# Patient Record
Sex: MALE | Race: WHITE | Employment: FULL TIME | URBAN - METROPOLITAN AREA
[De-identification: names, ages, dates, MRNs, and addresses within clinical notes are randomized per-mention and may not be internally consistent; named-entity substitution may affect disease eponyms.]

---

## 2019-03-16 ENCOUNTER — HOSPITAL ENCOUNTER (EMERGENCY)
Facility: MEDICAL CENTER | Age: 59
End: 2019-03-17
Attending: EMERGENCY MEDICINE
Payer: MEDICAID

## 2019-03-16 DIAGNOSIS — M54.41 ACUTE RIGHT-SIDED LOW BACK PAIN WITH RIGHT-SIDED SCIATICA: ICD-10-CM

## 2019-03-16 PROCEDURE — 85027 COMPLETE CBC AUTOMATED: CPT

## 2019-03-16 PROCEDURE — 96375 TX/PRO/DX INJ NEW DRUG ADDON: CPT

## 2019-03-16 PROCEDURE — 700111 HCHG RX REV CODE 636 W/ 250 OVERRIDE (IP): Performed by: EMERGENCY MEDICINE

## 2019-03-16 PROCEDURE — 99285 EMERGENCY DEPT VISIT HI MDM: CPT

## 2019-03-16 PROCEDURE — 96374 THER/PROPH/DIAG INJ IV PUSH: CPT

## 2019-03-16 PROCEDURE — 80048 BASIC METABOLIC PNL TOTAL CA: CPT

## 2019-03-16 PROCEDURE — 85007 BL SMEAR W/DIFF WBC COUNT: CPT

## 2019-03-16 RX ORDER — KETOROLAC TROMETHAMINE 30 MG/ML
15 INJECTION, SOLUTION INTRAMUSCULAR; INTRAVENOUS ONCE
Status: COMPLETED | OUTPATIENT
Start: 2019-03-17 | End: 2019-03-16

## 2019-03-16 RX ORDER — ONDANSETRON 2 MG/ML
4 INJECTION INTRAMUSCULAR; INTRAVENOUS ONCE
Status: COMPLETED | OUTPATIENT
Start: 2019-03-17 | End: 2019-03-16

## 2019-03-16 RX ORDER — MORPHINE SULFATE 4 MG/ML
4 INJECTION, SOLUTION INTRAMUSCULAR; INTRAVENOUS ONCE
Status: COMPLETED | OUTPATIENT
Start: 2019-03-17 | End: 2019-03-16

## 2019-03-16 RX ADMIN — KETOROLAC TROMETHAMINE 15 MG: 30 INJECTION, SOLUTION INTRAMUSCULAR; INTRAVENOUS at 23:47

## 2019-03-16 RX ADMIN — MORPHINE SULFATE 4 MG: 4 INJECTION INTRAVENOUS at 23:47

## 2019-03-16 RX ADMIN — ONDANSETRON 4 MG: 2 INJECTION INTRAMUSCULAR; INTRAVENOUS at 23:47

## 2019-03-16 ASSESSMENT — PAIN DESCRIPTION - DESCRIPTORS: DESCRIPTORS: ACHING

## 2019-03-17 ENCOUNTER — APPOINTMENT (OUTPATIENT)
Dept: RADIOLOGY | Facility: MEDICAL CENTER | Age: 59
End: 2019-03-17
Attending: EMERGENCY MEDICINE
Payer: MEDICAID

## 2019-03-17 VITALS
RESPIRATION RATE: 14 BRPM | WEIGHT: 249.12 LBS | TEMPERATURE: 98.9 F | HEART RATE: 74 BPM | SYSTOLIC BLOOD PRESSURE: 119 MMHG | BODY MASS INDEX: 34.75 KG/M2 | OXYGEN SATURATION: 93 % | DIASTOLIC BLOOD PRESSURE: 60 MMHG

## 2019-03-17 LAB
ANION GAP SERPL CALC-SCNC: 9 MMOL/L (ref 0–11.9)
ANION GAP SERPL CALC-SCNC: 9 MMOL/L (ref 0–11.9)
BASOPHILS # BLD AUTO: 0 % (ref 0–1.8)
BASOPHILS # BLD: 0 K/UL (ref 0–0.12)
BUN SERPL-MCNC: 21 MG/DL (ref 8–22)
BUN SERPL-MCNC: 21 MG/DL (ref 8–22)
CALCIUM SERPL-MCNC: 9.1 MG/DL (ref 8.5–10.5)
CALCIUM SERPL-MCNC: 9.4 MG/DL (ref 8.5–10.5)
CHLORIDE SERPL-SCNC: 101 MMOL/L (ref 96–112)
CHLORIDE SERPL-SCNC: 101 MMOL/L (ref 96–112)
CO2 SERPL-SCNC: 20 MMOL/L (ref 20–33)
CO2 SERPL-SCNC: 21 MMOL/L (ref 20–33)
CREAT SERPL-MCNC: 0.97 MG/DL (ref 0.5–1.4)
CREAT SERPL-MCNC: 1.55 MG/DL (ref 0.5–1.4)
EKG IMPRESSION: NORMAL
EOSINOPHIL # BLD AUTO: 0.32 K/UL (ref 0–0.51)
EOSINOPHIL NFR BLD: 3 % (ref 0–6.9)
ERYTHROCYTE [DISTWIDTH] IN BLOOD BY AUTOMATED COUNT: 46.2 FL (ref 35.9–50)
GLUCOSE SERPL-MCNC: 65 MG/DL (ref 65–99)
GLUCOSE SERPL-MCNC: 75 MG/DL (ref 65–99)
HCT VFR BLD AUTO: 46.5 % (ref 42–52)
HGB BLD-MCNC: 17.3 G/DL (ref 14–18)
LYMPHOCYTES # BLD AUTO: 2.84 K/UL (ref 1–4.8)
LYMPHOCYTES NFR BLD: 27 % (ref 22–41)
MANUAL DIFF BLD: NORMAL
MCH RBC QN AUTO: 33.9 PG (ref 27–33)
MCHC RBC AUTO-ENTMCNC: 37.2 G/DL (ref 33.7–35.3)
MCV RBC AUTO: 91.2 FL (ref 81.4–97.8)
MONOCYTES # BLD AUTO: 0.42 K/UL (ref 0–0.85)
MONOCYTES NFR BLD AUTO: 4 % (ref 0–13.4)
MORPHOLOGY BLD-IMP: NORMAL
NEUTROPHILS # BLD AUTO: 6.93 K/UL (ref 1.82–7.42)
NEUTROPHILS NFR BLD: 65 % (ref 44–72)
NEUTS BAND NFR BLD MANUAL: 1 % (ref 0–10)
NRBC # BLD AUTO: 0 K/UL
NRBC BLD-RTO: 0 /100 WBC
PLATELET # BLD AUTO: 220 K/UL (ref 164–446)
PMV BLD AUTO: 11.1 FL (ref 9–12.9)
POTASSIUM SERPL-SCNC: 5.1 MMOL/L (ref 3.6–5.5)
POTASSIUM SERPL-SCNC: 6.2 MMOL/L (ref 3.6–5.5)
RBC # BLD AUTO: 5.1 M/UL (ref 4.7–6.1)
SODIUM SERPL-SCNC: 130 MMOL/L (ref 135–145)
SODIUM SERPL-SCNC: 131 MMOL/L (ref 135–145)
WBC # BLD AUTO: 10.5 K/UL (ref 4.8–10.8)

## 2019-03-17 PROCEDURE — 93005 ELECTROCARDIOGRAM TRACING: CPT | Performed by: EMERGENCY MEDICINE

## 2019-03-17 PROCEDURE — A9585 GADOBUTROL INJECTION: HCPCS | Performed by: EMERGENCY MEDICINE

## 2019-03-17 PROCEDURE — 700111 HCHG RX REV CODE 636 W/ 250 OVERRIDE (IP): Performed by: EMERGENCY MEDICINE

## 2019-03-17 PROCEDURE — 96376 TX/PRO/DX INJ SAME DRUG ADON: CPT | Mod: XU

## 2019-03-17 PROCEDURE — 72158 MRI LUMBAR SPINE W/O & W/DYE: CPT

## 2019-03-17 PROCEDURE — 700117 HCHG RX CONTRAST REV CODE 255: Performed by: EMERGENCY MEDICINE

## 2019-03-17 PROCEDURE — 80048 BASIC METABOLIC PNL TOTAL CA: CPT

## 2019-03-17 PROCEDURE — 96375 TX/PRO/DX INJ NEW DRUG ADDON: CPT | Mod: XU

## 2019-03-17 PROCEDURE — 72158 MRI LUMBAR SPINE W/O & W/DYE: CPT | Performed by: RADIOLOGY

## 2019-03-17 PROCEDURE — 700105 HCHG RX REV CODE 258: Performed by: EMERGENCY MEDICINE

## 2019-03-17 RX ORDER — GADOBUTROL 604.72 MG/ML
11 INJECTION INTRAVENOUS ONCE
Status: COMPLETED | OUTPATIENT
Start: 2019-03-17 | End: 2019-03-17

## 2019-03-17 RX ORDER — ONDANSETRON 2 MG/ML
4 INJECTION INTRAMUSCULAR; INTRAVENOUS ONCE
Status: COMPLETED | OUTPATIENT
Start: 2019-03-17 | End: 2019-03-17

## 2019-03-17 RX ORDER — HYDROMORPHONE HYDROCHLORIDE 1 MG/ML
0.5 INJECTION, SOLUTION INTRAMUSCULAR; INTRAVENOUS; SUBCUTANEOUS ONCE
Status: COMPLETED | OUTPATIENT
Start: 2019-03-17 | End: 2019-03-17

## 2019-03-17 RX ORDER — SODIUM CHLORIDE 9 MG/ML
1000 INJECTION, SOLUTION INTRAVENOUS ONCE
Status: COMPLETED | OUTPATIENT
Start: 2019-03-17 | End: 2019-03-17

## 2019-03-17 RX ADMIN — SODIUM CHLORIDE 1000 ML: 9 INJECTION, SOLUTION INTRAVENOUS at 02:30

## 2019-03-17 RX ADMIN — HYDROMORPHONE HYDROCHLORIDE 0.5 MG: 1 INJECTION, SOLUTION INTRAMUSCULAR; INTRAVENOUS; SUBCUTANEOUS at 01:00

## 2019-03-17 RX ADMIN — ONDANSETRON 4 MG: 2 INJECTION INTRAMUSCULAR; INTRAVENOUS at 04:37

## 2019-03-17 RX ADMIN — HYDROMORPHONE HYDROCHLORIDE 0.5 MG: 1 INJECTION, SOLUTION INTRAMUSCULAR; INTRAVENOUS; SUBCUTANEOUS at 02:34

## 2019-03-17 RX ADMIN — GADOBUTROL 11 ML: 604.72 INJECTION INTRAVENOUS at 03:25

## 2019-03-17 NOTE — ED NOTES
Patient appears in discomfort as stated in triage note, he reports he drove himself to ED and is now unsteady on his feet, he was wheeled from waiting room to [facility] where Dr. Andrade evaluated him.

## 2019-03-17 NOTE — ED NOTES
Patient is being discharged from ED to home. Discharge instructions were discussed by RN with patient and/or Family. No questions at this time. Medications were discussed with patient. VS within normal limits or have been addressed with patient and MD.   Discussed follow-up with PCP and Neurosurg

## 2019-03-17 NOTE — ED NOTES
Hourly rounding performed. Assessed patient complaints and Bathroom/comfort needs.  Addressed patients pain. Noted that patient had slightly low O2 sats after pain meds, placed patient on 2 LPM NC

## 2019-03-17 NOTE — ED TRIAGE NOTES
"Edil Silas  58 y.o. male  Chief Complaint   Patient presents with   • Low Back Pain     \"It is shooting down my right leg and aching. It's been a couple days now. I can't get comfortable, and can't sleep.\" Denies trauma or injury.        Pt amb to triage with limp for above complaint.  Pt is alert and oriented, speaking in full sentences, follows commands and responds appropriately to questions. Pt shifting in chair and appears uncomfortable.    Pt placed in lobby. Pt educated on triage process. Pt encouraged to alert staff for any changes.    "

## 2019-03-17 NOTE — DISCHARGE INSTRUCTIONS
Your MRI showed severe spinal stenosis, it is very important that you follow-up with a neurosurgeon for possible surgical management of this.  We have provided you follow-up information with Dr. hoang and would like you to call him first thing Monday morning; you may also follow-up with a neurosurgeon of your choice.    Your MRI results:  1. Multilevel disc and facet degeneration.  2. L2-L3 moderate to severe spinal stenosis and right foraminal narrowing.  3. L3-L4 moderate spinal stenosis.  4. Mild to moderate spinal stenosis at L1-L2 and L4-L5.  5. Foraminal narrowing as detailed.

## 2019-03-17 NOTE — ED NOTES
MRI screening form signed by patient and faxed, he reports no contraindications except for having denture which can be removed.

## 2019-03-17 NOTE — ED PROVIDER NOTES
"ED Provider Note    Scribed for Gabino Andrade M.D. by Maria Guadalupe Mcdonald. 3/16/2019, 11:13 PM.    Primary care provider: Frederic Shafer M.D.  Means of arrival: Walk-in  History obtained from: Patient  History limited by: None    CHIEF COMPLAINT  Chief Complaint   Patient presents with   • Low Back Pain     \"It is shooting down my right leg and aching. It's been a couple days now. I can't get comfortable, and can't sleep.\" Denies trauma or injury.        HPI  Edil Rosen is a 58 y.o. male who presents to the Emergency Department with lower back pain onset two days ago. Patient describes the pain as right-sided, with associated numbness beginning in his right thigh and radiating into the right inguinal region as well as down to his right knee and right foot. The patient states the pain is exacerbated upon walking. He has not tried any medication for his symptoms. No alleviating factors identified.     Patient additionally notes he used to have to self-catheterize occasionally due to an enlarged prostate but has not done so recently. The patient reports no IV drug use. He denies any fevers, urinary retention, constipation, incontinence, saddle anesthesia, or lower extremity weakness.    REVIEW OF SYSTEMS  Pertinent positives include right-sided lower back pain, radiating numbness to right lower extremity, right inguinal region, and decreased sensation to right foot. Pertinent negatives include no fevers, urinary retention, constipation, incontinence, lower extremity weakness, and saddle anesthesia.. As above, all other systems reviewed and are negative.   See HPI for further details.     PAST MEDICAL HISTORY   has a past medical history of Back pain and Screening cholesterol level (1/21/2011).    SURGICAL HISTORY  patient denies any surgical history    SOCIAL HISTORY  Social History   Substance Use Topics   • Smoking status: Current Every Day Smoker     Types: Cigarettes     Start date: 3/16/1980   • Smokeless " tobacco: Never Used      Comment: 1 PPD x 20 yrs.   • Alcohol use No      History   Drug Use No       FAMILY HISTORY  Family History   Problem Relation Age of Onset   • Cancer Mother         brain       CURRENT MEDICATIONS  No current facility-administered medications on file prior to encounter.      Current Outpatient Prescriptions on File Prior to Encounter   Medication Sig Dispense Refill   • oxycodone-acetaminophen (PERCOCET) 5-325 MG TABS Take 1-2 Tabs by mouth every four hours as needed. 15 Tab 0       ALLERGIES  Allergies   Allergen Reactions   • Pcn [Penicillins] Anaphylaxis       PHYSICAL EXAM  VITAL SIGNS: /91   Pulse 95   Temp 37.2 °C (98.9 °F) (Temporal)   Resp 16   Wt 113 kg (249 lb 1.9 oz)   SpO2 95%   BMI 34.75 kg/m²   Vitals reviewed.  Constitutional: Alert. Appears uncomfortable.  HENT: No signs of trauma, Bilateral external ears normal, Nose normal. Moist mucous membranes.  Eyes: Pupils are equal and reactive, Conjunctiva normal, Non-icteric.   Neck: Normal range of motion, No tenderness, Supple, No stridor.   Lymphatic: No lymphadenopathy noted.   Cardiovascular: Regular rate and rhythm, no murmurs.   Thorax & Lungs: Normal breath sounds, No respiratory distress, No wheezing, No chest tenderness.   Abdomen: Bowel sounds normal, Soft, No tenderness, No peritoneal signs, No masses, No pulsatile masses.    Skin: Warm, Dry, No erythema, No rash.   Back: Tenderness in right lumbar paraspinal region, Normal alignment.   Extremities: Intact distal pulses, No edema, No cyanosis  Musculoskeletal: No major deformities noted. 5/5 strength in BLLE.  Neurologic: Alert, decreased sensation in right thigh and right foot, no bony spinal tenderness, positive Straight Leg Raise test on the left lower extremity.   Psychiatric: Affect normal, Judgment normal, Mood normal.       DIAGNOSTIC STUDIES / PROCEDURES    LABS  Labs Reviewed   BASIC METABOLIC PANEL - Abnormal; Notable for the following:         Result Value    Sodium 130 (*)     Potassium 6.2 (*)     Creatinine 1.55 (*)     All other components within normal limits   ESTIMATED GFR - Abnormal; Notable for the following:     GFR If  56 (*)     GFR If Non  46 (*)     All other components within normal limits   CBC WITH DIFFERENTIAL      All labs reviewed by me.      RADIOLOGY  MR-LUMBAR SPINE-W/O    (Results Pending)     The radiologist's interpretation of all radiological studies have been reviewed by me.    COURSE & MEDICAL DECISION MAKING  Nursing notes, VS, PMSFHx reviewed in chart.  Differential diagnoses include but not limited to: Disc disease, spinal stenosis, cauda equina, epidural abscess, muscle spasm.     11:13 PM Patient seen and examined at bedside. Patient arrives afebrile with normal vital signs. Patient appears well hydrated and non-toxic. The physical exam is remarkable for positive straight leg raise test and tenderness in right lumbar paraspinal region with associated decreased sensation over RLE. No fevers, saddle anesthesia, lower extremity weakness to suggest epidural abscess. Not an IV drug abuser. There is concern for disc extrusion with nerve root compression given his new neurologic symptoms so patient will require MRI for further evaluation.    11:30 PM Ordered for MRI L-spine w/o, estimated GFR CBC and BMP to evaluate. Patient will be treated with morphine 4 mg, Zofran 4 mg, and Toradol 15 mg for his symptoms.  Labs initially concerning for hyperkalemia but EKG is unchanged from prior. IV fluids started for hyperkalemia and potential RODO. Repeat BMP was performed due to hemolyzed sample and potassium was noted to be well within normal limits. Initial creatinine was elevated as well but this also improved.    Patient given several additional doses of pain medication while awaiting MRI.    Patient care transferred to my partner, Dr. Forbes, who will follow up MRI results and dispo as  appropriate.      FINAL IMPRESSION  1. Acute right-sided low back pain with right-sided sciatica          Maria Guadalupe CABRERA (Scribe), am scribing for, and in the presence of, Gabino Andrade M.D..    Electronically signed by: Maria Guadalupe Mcdonald (Scriblynn), 3/16/2019    Gabino CABRERA M.D. personally performed the services described in this documentation, as scribed by Maria Guadalupe Mcdonald in my presence, and it is both accurate and complete. C.    The note accurately reflects work and decisions made by me.  Gabino Andrade  3/17/2019  3:13 AM

## 2019-03-17 NOTE — ED NOTES
Hourly rounding performed. Assessed patient complaints and Bathroom/comfort needs.  Pt given some PO fluids

## 2019-07-16 ENCOUNTER — HOSPITAL ENCOUNTER (EMERGENCY)
Facility: MEDICAL CENTER | Age: 59
End: 2019-07-16
Payer: MEDICAID

## 2019-07-16 VITALS
SYSTOLIC BLOOD PRESSURE: 142 MMHG | DIASTOLIC BLOOD PRESSURE: 90 MMHG | TEMPERATURE: 98.2 F | WEIGHT: 227.74 LBS | HEART RATE: 106 BPM | RESPIRATION RATE: 16 BRPM | HEIGHT: 71 IN | OXYGEN SATURATION: 94 % | BODY MASS INDEX: 31.88 KG/M2

## 2019-07-16 PROCEDURE — 302449 STATCHG TRIAGE ONLY (STATISTIC)

## 2020-08-04 ENCOUNTER — HOSPITAL ENCOUNTER (OUTPATIENT)
Facility: MEDICAL CENTER | Age: 60
End: 2020-08-05
Attending: EMERGENCY MEDICINE | Admitting: INTERNAL MEDICINE
Payer: MEDICAID

## 2020-08-04 ENCOUNTER — APPOINTMENT (OUTPATIENT)
Dept: RADIOLOGY | Facility: MEDICAL CENTER | Age: 60
End: 2020-08-04
Attending: EMERGENCY MEDICINE
Payer: MEDICAID

## 2020-08-04 DIAGNOSIS — R07.9 ACUTE CHEST PAIN: ICD-10-CM

## 2020-08-04 PROBLEM — N40.0 BPH (BENIGN PROSTATIC HYPERPLASIA): Status: ACTIVE | Noted: 2020-08-04

## 2020-08-04 PROBLEM — F32.A DEPRESSION: Status: ACTIVE | Noted: 2020-08-04

## 2020-08-04 LAB
ALBUMIN SERPL BCP-MCNC: 4.7 G/DL (ref 3.2–4.9)
ALBUMIN/GLOB SERPL: 1.7 G/DL
ALP SERPL-CCNC: 41 U/L (ref 30–99)
ALT SERPL-CCNC: 28 U/L (ref 2–50)
ANION GAP SERPL CALC-SCNC: 16 MMOL/L (ref 7–16)
AST SERPL-CCNC: 25 U/L (ref 12–45)
BASOPHILS # BLD AUTO: 0.4 % (ref 0–1.8)
BASOPHILS # BLD: 0.05 K/UL (ref 0–0.12)
BILIRUB SERPL-MCNC: 0.9 MG/DL (ref 0.1–1.5)
BLOOD CULTURE HOLD CXBCH: NORMAL
BLOOD CULTURE HOLD CXBCH: NORMAL
BUN SERPL-MCNC: 14 MG/DL (ref 8–22)
CALCIUM SERPL-MCNC: 9.1 MG/DL (ref 8.5–10.5)
CHLORIDE SERPL-SCNC: 99 MMOL/L (ref 96–112)
CO2 SERPL-SCNC: 18 MMOL/L (ref 20–33)
COVID ORDER STATUS COVID19: NORMAL
CREAT SERPL-MCNC: 0.63 MG/DL (ref 0.5–1.4)
D DIMER PPP IA.FEU-MCNC: >20 UG/ML (FEU) (ref 0–0.5)
EKG IMPRESSION: NORMAL
EOSINOPHIL # BLD AUTO: 0.16 K/UL (ref 0–0.51)
EOSINOPHIL NFR BLD: 1.4 % (ref 0–6.9)
ERYTHROCYTE [DISTWIDTH] IN BLOOD BY AUTOMATED COUNT: 43.6 FL (ref 35.9–50)
GLOBULIN SER CALC-MCNC: 2.7 G/DL (ref 1.9–3.5)
GLUCOSE SERPL-MCNC: 99 MG/DL (ref 65–99)
HCT VFR BLD AUTO: 43.7 % (ref 42–52)
HGB BLD-MCNC: 14.9 G/DL (ref 14–18)
IMM GRANULOCYTES # BLD AUTO: 0.04 K/UL (ref 0–0.11)
IMM GRANULOCYTES NFR BLD AUTO: 0.4 % (ref 0–0.9)
LYMPHOCYTES # BLD AUTO: 2.18 K/UL (ref 1–4.8)
LYMPHOCYTES NFR BLD: 19.4 % (ref 22–41)
MAGNESIUM SERPL-MCNC: 2 MG/DL (ref 1.5–2.5)
MCH RBC QN AUTO: 30.7 PG (ref 27–33)
MCHC RBC AUTO-ENTMCNC: 34.1 G/DL (ref 33.7–35.3)
MCV RBC AUTO: 90.1 FL (ref 81.4–97.8)
MONOCYTES # BLD AUTO: 0.91 K/UL (ref 0–0.85)
MONOCYTES NFR BLD AUTO: 8.1 % (ref 0–13.4)
NEUTROPHILS # BLD AUTO: 7.88 K/UL (ref 1.82–7.42)
NEUTROPHILS NFR BLD: 70.3 % (ref 44–72)
NRBC # BLD AUTO: 0 K/UL
NRBC BLD-RTO: 0 /100 WBC
NT-PROBNP SERPL IA-MCNC: 31 PG/ML (ref 0–125)
PLATELET # BLD AUTO: 200 K/UL (ref 164–446)
PMV BLD AUTO: 11 FL (ref 9–12.9)
POTASSIUM SERPL-SCNC: 4 MMOL/L (ref 3.6–5.5)
PROT SERPL-MCNC: 7.4 G/DL (ref 6–8.2)
RBC # BLD AUTO: 4.85 M/UL (ref 4.7–6.1)
SARS-COV-2 RNA RESP QL NAA+PROBE: NOTDETECTED
SODIUM SERPL-SCNC: 133 MMOL/L (ref 135–145)
SPECIMEN SOURCE: NORMAL
TROPONIN T SERPL-MCNC: 8 NG/L (ref 6–19)
TROPONIN T SERPL-MCNC: 9 NG/L (ref 6–19)
WBC # BLD AUTO: 11.2 K/UL (ref 4.8–10.8)

## 2020-08-04 PROCEDURE — A9270 NON-COVERED ITEM OR SERVICE: HCPCS | Performed by: EMERGENCY MEDICINE

## 2020-08-04 PROCEDURE — 85025 COMPLETE CBC W/AUTO DIFF WBC: CPT

## 2020-08-04 PROCEDURE — G0378 HOSPITAL OBSERVATION PER HR: HCPCS

## 2020-08-04 PROCEDURE — 99220 PR INITIAL OBSERVATION CARE,LEVL III: CPT | Performed by: HOSPITALIST

## 2020-08-04 PROCEDURE — 83735 ASSAY OF MAGNESIUM: CPT

## 2020-08-04 PROCEDURE — 93017 CV STRESS TEST TRACING ONLY: CPT | Performed by: HOSPITALIST

## 2020-08-04 PROCEDURE — 93005 ELECTROCARDIOGRAM TRACING: CPT | Mod: XE | Performed by: EMERGENCY MEDICINE

## 2020-08-04 PROCEDURE — 84484 ASSAY OF TROPONIN QUANT: CPT

## 2020-08-04 PROCEDURE — 83880 ASSAY OF NATRIURETIC PEPTIDE: CPT

## 2020-08-04 PROCEDURE — 700102 HCHG RX REV CODE 250 W/ 637 OVERRIDE(OP): Performed by: HOSPITALIST

## 2020-08-04 PROCEDURE — 85379 FIBRIN DEGRADATION QUANT: CPT

## 2020-08-04 PROCEDURE — 700101 HCHG RX REV CODE 250: Performed by: EMERGENCY MEDICINE

## 2020-08-04 PROCEDURE — 99285 EMERGENCY DEPT VISIT HI MDM: CPT

## 2020-08-04 PROCEDURE — 700105 HCHG RX REV CODE 258: Performed by: HOSPITALIST

## 2020-08-04 PROCEDURE — C9803 HOPD COVID-19 SPEC COLLECT: HCPCS | Performed by: EMERGENCY MEDICINE

## 2020-08-04 PROCEDURE — A9270 NON-COVERED ITEM OR SERVICE: HCPCS | Performed by: HOSPITALIST

## 2020-08-04 PROCEDURE — 96374 THER/PROPH/DIAG INJ IV PUSH: CPT | Mod: XU

## 2020-08-04 PROCEDURE — U0003 INFECTIOUS AGENT DETECTION BY NUCLEIC ACID (DNA OR RNA); SEVERE ACUTE RESPIRATORY SYNDROME CORONAVIRUS 2 (SARS-COV-2) (CORONAVIRUS DISEASE [COVID-19]), AMPLIFIED PROBE TECHNIQUE, MAKING USE OF HIGH THROUGHPUT TECHNOLOGIES AS DESCRIBED BY CMS-2020-01-R: HCPCS

## 2020-08-04 PROCEDURE — 80053 COMPREHEN METABOLIC PANEL: CPT

## 2020-08-04 PROCEDURE — 71045 X-RAY EXAM CHEST 1 VIEW: CPT

## 2020-08-04 PROCEDURE — 36415 COLL VENOUS BLD VENIPUNCTURE: CPT

## 2020-08-04 PROCEDURE — 700102 HCHG RX REV CODE 250 W/ 637 OVERRIDE(OP): Performed by: EMERGENCY MEDICINE

## 2020-08-04 RX ORDER — BUPROPION HYDROCHLORIDE 300 MG/1
300 TABLET ORAL EVERY MORNING
Status: DISCONTINUED | OUTPATIENT
Start: 2020-08-05 | End: 2020-08-04

## 2020-08-04 RX ORDER — TAMSULOSIN HYDROCHLORIDE 0.4 MG/1
0.4 CAPSULE ORAL
COMMUNITY

## 2020-08-04 RX ORDER — METOPROLOL TARTRATE 1 MG/ML
5 INJECTION, SOLUTION INTRAVENOUS ONCE
Status: COMPLETED | OUTPATIENT
Start: 2020-08-04 | End: 2020-08-04

## 2020-08-04 RX ORDER — BUPROPION HYDROCHLORIDE 100 MG/1
200 TABLET, EXTENDED RELEASE ORAL 2 TIMES DAILY
Status: DISCONTINUED | OUTPATIENT
Start: 2020-08-05 | End: 2020-08-05 | Stop reason: HOSPADM

## 2020-08-04 RX ORDER — PROMETHAZINE HYDROCHLORIDE 12.5 MG/1
12.5-25 SUPPOSITORY RECTAL EVERY 4 HOURS PRN
Status: DISCONTINUED | OUTPATIENT
Start: 2020-08-04 | End: 2020-08-05 | Stop reason: HOSPADM

## 2020-08-04 RX ORDER — VENLAFAXINE HYDROCHLORIDE 37.5 MG/1
37.5 CAPSULE, EXTENDED RELEASE ORAL EVERY MORNING
Status: DISCONTINUED | OUTPATIENT
Start: 2020-08-05 | End: 2020-08-05 | Stop reason: HOSPADM

## 2020-08-04 RX ORDER — ONDANSETRON 4 MG/1
4 TABLET, ORALLY DISINTEGRATING ORAL EVERY 4 HOURS PRN
Status: DISCONTINUED | OUTPATIENT
Start: 2020-08-04 | End: 2020-08-05 | Stop reason: HOSPADM

## 2020-08-04 RX ORDER — BUPROPION HYDROCHLORIDE 150 MG/1
150 TABLET, EXTENDED RELEASE ORAL DAILY
COMMUNITY

## 2020-08-04 RX ORDER — BUPROPION HYDROCHLORIDE 150 MG/1
150 TABLET, EXTENDED RELEASE ORAL DAILY
Status: DISCONTINUED | OUTPATIENT
Start: 2020-08-05 | End: 2020-08-04

## 2020-08-04 RX ORDER — PROMETHAZINE HYDROCHLORIDE 25 MG/1
12.5-25 TABLET ORAL EVERY 4 HOURS PRN
Status: DISCONTINUED | OUTPATIENT
Start: 2020-08-04 | End: 2020-08-05 | Stop reason: HOSPADM

## 2020-08-04 RX ORDER — FENOFIBRATE 134 MG/1
134 CAPSULE ORAL EVERY MORNING
Status: DISCONTINUED | OUTPATIENT
Start: 2020-08-05 | End: 2020-08-05 | Stop reason: HOSPADM

## 2020-08-04 RX ORDER — BUPROPION HYDROCHLORIDE 300 MG/1
300 TABLET ORAL EVERY MORNING
COMMUNITY

## 2020-08-04 RX ORDER — TAMSULOSIN HYDROCHLORIDE 0.4 MG/1
0.4 CAPSULE ORAL
Status: DISCONTINUED | OUTPATIENT
Start: 2020-08-05 | End: 2020-08-05 | Stop reason: HOSPADM

## 2020-08-04 RX ORDER — FENOFIBRATE 160 MG/1
160 TABLET ORAL EVERY MORNING
COMMUNITY

## 2020-08-04 RX ORDER — NICOTINE 21 MG/24HR
14 PATCH, TRANSDERMAL 24 HOURS TRANSDERMAL
Status: DISCONTINUED | OUTPATIENT
Start: 2020-08-05 | End: 2020-08-05 | Stop reason: HOSPADM

## 2020-08-04 RX ORDER — FLUOXETINE HYDROCHLORIDE 20 MG/1
80 CAPSULE ORAL EVERY MORNING
Status: DISCONTINUED | OUTPATIENT
Start: 2020-08-05 | End: 2020-08-05 | Stop reason: HOSPADM

## 2020-08-04 RX ORDER — ONDANSETRON 2 MG/ML
4 INJECTION INTRAMUSCULAR; INTRAVENOUS EVERY 4 HOURS PRN
Status: DISCONTINUED | OUTPATIENT
Start: 2020-08-04 | End: 2020-08-05 | Stop reason: HOSPADM

## 2020-08-04 RX ORDER — OMEPRAZOLE 20 MG/1
20 CAPSULE, DELAYED RELEASE ORAL 2 TIMES DAILY
Status: DISCONTINUED | OUTPATIENT
Start: 2020-08-04 | End: 2020-08-05 | Stop reason: HOSPADM

## 2020-08-04 RX ORDER — IBUPROFEN 800 MG/1
800 TABLET ORAL EVERY 6 HOURS PRN
Status: ON HOLD | COMMUNITY
End: 2020-08-05

## 2020-08-04 RX ORDER — OMEPRAZOLE 20 MG/1
20 CAPSULE, DELAYED RELEASE ORAL EVERY MORNING
Status: ON HOLD | COMMUNITY
End: 2020-08-05 | Stop reason: SDUPTHER

## 2020-08-04 RX ORDER — SODIUM CHLORIDE 9 MG/ML
INJECTION, SOLUTION INTRAVENOUS CONTINUOUS
Status: DISCONTINUED | OUTPATIENT
Start: 2020-08-04 | End: 2020-08-05 | Stop reason: HOSPADM

## 2020-08-04 RX ORDER — PROCHLORPERAZINE EDISYLATE 5 MG/ML
5-10 INJECTION INTRAMUSCULAR; INTRAVENOUS EVERY 4 HOURS PRN
Status: DISCONTINUED | OUTPATIENT
Start: 2020-08-04 | End: 2020-08-05 | Stop reason: HOSPADM

## 2020-08-04 RX ORDER — FLUOXETINE HYDROCHLORIDE 40 MG/1
80 CAPSULE ORAL EVERY MORNING
COMMUNITY

## 2020-08-04 RX ORDER — ACETAMINOPHEN 325 MG/1
650 TABLET ORAL EVERY 6 HOURS PRN
Status: DISCONTINUED | OUTPATIENT
Start: 2020-08-04 | End: 2020-08-05 | Stop reason: HOSPADM

## 2020-08-04 RX ORDER — VENLAFAXINE HYDROCHLORIDE 37.5 MG/1
37.5 CAPSULE, EXTENDED RELEASE ORAL EVERY MORNING
COMMUNITY

## 2020-08-04 RX ADMIN — METOPROLOL TARTRATE 5 MG: 5 INJECTION, SOLUTION INTRAVENOUS at 17:52

## 2020-08-04 RX ADMIN — NITROGLYCERIN 0.5 INCH: 20 OINTMENT TOPICAL at 16:07

## 2020-08-04 RX ADMIN — LIDOCAINE HYDROCHLORIDE 15 ML: 20 SOLUTION OROPHARYNGEAL at 20:37

## 2020-08-04 RX ADMIN — OMEPRAZOLE 20 MG: 20 CAPSULE, DELAYED RELEASE ORAL at 20:35

## 2020-08-04 RX ADMIN — ASPIRIN 81 MG: 81 TABLET, COATED ORAL at 20:38

## 2020-08-04 RX ADMIN — ACETAMINOPHEN 650 MG: 325 TABLET, FILM COATED ORAL at 21:48

## 2020-08-04 RX ADMIN — SODIUM CHLORIDE: 9 INJECTION, SOLUTION INTRAVENOUS at 20:38

## 2020-08-04 SDOH — HEALTH STABILITY: MENTAL HEALTH: HOW OFTEN DO YOU HAVE A DRINK CONTAINING ALCOHOL?: NEVER

## 2020-08-04 ASSESSMENT — PATIENT HEALTH QUESTIONNAIRE - PHQ9
2. FEELING DOWN, DEPRESSED, IRRITABLE, OR HOPELESS: NOT AT ALL
SUM OF ALL RESPONSES TO PHQ9 QUESTIONS 1 AND 2: 0
1. LITTLE INTEREST OR PLEASURE IN DOING THINGS: NOT AT ALL

## 2020-08-04 ASSESSMENT — LIFESTYLE VARIABLES
TOTAL SCORE: 0
EVER_SMOKED: YES
ON A TYPICAL DAY WHEN YOU DRINK ALCOHOL HOW MANY DRINKS DO YOU HAVE: 0
EVER HAD A DRINK FIRST THING IN THE MORNING TO STEADY YOUR NERVES TO GET RID OF A HANGOVER: NO
TOTAL SCORE: 0
AVERAGE NUMBER OF DAYS PER WEEK YOU HAVE A DRINK CONTAINING ALCOHOL: 0
EVER FELT BAD OR GUILTY ABOUT YOUR DRINKING: NO
DOES PATIENT WANT TO STOP DRINKING: NO
ALCOHOL_USE: NO
CONSUMPTION TOTAL: NEGATIVE
HOW MANY TIMES IN THE PAST YEAR HAVE YOU HAD 5 OR MORE DRINKS IN A DAY: 0
TOTAL SCORE: 0
SUBSTANCE_ABUSE: 0
HAVE YOU EVER FELT YOU SHOULD CUT DOWN ON YOUR DRINKING: NO
HAVE PEOPLE ANNOYED YOU BY CRITICIZING YOUR DRINKING: NO

## 2020-08-04 ASSESSMENT — ENCOUNTER SYMPTOMS
SINUS PAIN: 0
MYALGIAS: 0
HALLUCINATIONS: 0
CLAUDICATION: 0
SPUTUM PRODUCTION: 0
DIZZINESS: 0
VOMITING: 0
CHILLS: 0
ABDOMINAL PAIN: 0
PND: 0
HEARTBURN: 0
PALPITATIONS: 0
FEVER: 0
BRUISES/BLEEDS EASILY: 0
BLURRED VISION: 0
HEADACHES: 0
BACK PAIN: 0
DEPRESSION: 0
DOUBLE VISION: 0
WHEEZING: 0
SHORTNESS OF BREATH: 1
HEMOPTYSIS: 0
COUGH: 0
NAUSEA: 0

## 2020-08-04 ASSESSMENT — FIBROSIS 4 INDEX: FIB4 SCORE: 1.39

## 2020-08-04 ASSESSMENT — PAIN DESCRIPTION - DESCRIPTORS: DESCRIPTORS: ACHING

## 2020-08-04 NOTE — ED TRIAGE NOTES
Chief Complaint   Patient presents with   • Chest Pain     substernal chest pain,          BIB ems for chest pain, substernal pain, non radiating, improved on palpation, no cardiac history.  Received 324 mg of ASA from EMS.    Tense en route but improved upon arrival.        Connected to monitor.  IV started.  Blood drawn.   Oriented to room.

## 2020-08-04 NOTE — ED PROVIDER NOTES
ED Provider Note    Scribed for Augusto Zhang M.D. by Matthieu Contreras. 8/4/2020  3:33 PM    Primary care provider: None noted  Means of arrival: EMS  History obtained from: Patient  History limited by: None    CHIEF COMPLAINT  Chief Complaint   Patient presents with   • Chest Pain     substernal chest pain,        HPI  Edil Rosen is a 59 y.o. male with a history of hypertension who presents to the Emergency Department for evaluation of chest pain. Patient states that his pain onset last night, and he tried to sleep it off however woke up with the pain still present. It is located in the center of his chest, does not radiate, and his pain is worsened with taking a deep breath. No factors are identified which alleviates his pain. He denies any associated nausea, vomiting, diaphoresis, shortness of breath or lightheadedness. Patient notes that he has a history of GERD and thus is having difficulty with determining whether this pain is heart and/or chest related or just a different presentation of his GERD. Given the persistence of his pain he called EMS who administered 324 mg of Aspirin and brought him here. His pain is still present upon arrival. He otherwise denies any fevers, chills or cough, however does note that he has been in a house with COVID positive members and works with [employer] who have had COVID positive patients as well.    REVIEW OF SYSTEMS  Pertinent negatives include no nausea, vomiting, diaphoresis, shortness of breath, lightheadedness, fever, chills, cough. As above, all other systems reviewed and are negative.   See HPI for further details.     PAST MEDICAL HISTORY   has a past medical history of BPH (benign prostatic hyperplasia), GERD (gastroesophageal reflux disease), and Hypertension.    SURGICAL HISTORY   has a past surgical history that includes cholecystectomy.    SOCIAL HISTORY  Social History     Tobacco Use   • Smoking status: Current Every Day Smoker     Packs/day: 0.50     Types:  "Cigarettes   • Smokeless tobacco: Never Used   Substance Use Topics   • Alcohol use: Never     Frequency: Never   • Drug use: Never      Social History     Substance and Sexual Activity   Drug Use Never       FAMILY HISTORY  History reviewed. No pertinent family history.    CURRENT MEDICATIONS  No current facility-administered medications on file prior to encounter.      No current outpatient medications on file prior to encounter.       ALLERGIES  Allergies   Allergen Reactions   • Penicillins        PHYSICAL EXAM  VITAL SIGNS: /75   Pulse 81   Temp 36.6 °C (97.8 °F) (Temporal)   Resp 16   Ht 1.803 m (5' 11\")   Wt 113.4 kg (250 lb)   SpO2 98%   BMI 34.87 kg/m²   Constitutional: Well developed, Truncal obesity, No acute distress, Non-toxic appearance.   HENT: Normocephalic, Atraumatic, Bilateral external ears normal, Oropharynx is clear mucous membranes are moist. No oral exudates or nasal discharge.   Eyes: Pupils are equal round and reactive, EOMI, Conjunctiva normal, No discharge.   Neck: Normal range of motion, No tenderness, Supple, No stridor. No meningismus.  Lymphatic: No lymphadenopathy noted.   Cardiovascular: Regular rate and rhythm without murmur rub or gallop.  Thorax & Lungs: Rhonchi bilaterally, no wheezes or rales. There is no chest wall tenderness.   Abdomen: Soft non-tender non-distended. There is no rebound or guarding. No organomegaly is appreciated. Bowel sounds are normal.  Skin: Normal without rash.   Back: No CVA or spinal tenderness.   Extremities: Intact distal pulses, No edema, No tenderness, No cyanosis, No clubbing. Capillary refill is less than 2 seconds. Negative Homans's sign  Musculoskeletal: Good range of motion in all major joints. No tenderness to palpation or major deformities noted.   Neurologic: Alert & oriented x 3, Normal motor function, Normal sensory function, No focal deficits noted. Reflexes are normal.  Psychiatric: Affect normal, Judgment normal, Mood " normal. There is no suicidal ideation or patient reported hallucinations.     DIAGNOSTIC STUDIES / PROCEDURES    LABS  Labs Reviewed   CBC WITH DIFFERENTIAL - Abnormal; Notable for the following components:       Result Value    WBC 11.2 (*)     Lymphocytes 19.40 (*)     Neutrophils (Absolute) 7.88 (*)     Monos (Absolute) 0.91 (*)     All other components within normal limits   COMP METABOLIC PANEL - Abnormal; Notable for the following components:    Sodium 133 (*)     Co2 18 (*)     All other components within normal limits   TROPONIN   COVID/SARS COV-2   ESTIMATED GFR   SARS-COV-2, PCR (IN-HOUSE)   BLOOD CULTURE,HOLD   BLOOD CULTURE,HOLD   COVID/SARS COV-2      All labs reviewed by me.    EKG Interpretation:  Results for orders placed or performed during the hospital encounter of 20   EKG   Result Value Ref Range    Report       [facility] Emergency Dept.    Test Date:  2020  Pt Name:    ADDISON ONEIL                 Department: ER  MRN:        [id number]                      Room:       [facility]  Gender:     Male                         Technician: [id number]  :        1960                   Requested By:ER TRIAGE PROTOCOL  Order #:    [id number]                    Reading MD: MICHAELA TAYLOR MD    Measurements  Intervals                                Axis  Rate:       79                           P:          21  IA:         164                          QRS:        46  QRSD:       102                          T:          47  QT:         380  QTc:        436    Interpretive Statements  SINUS RHYTHM  MULTIPLE VENTRICULAR PREMATURE COMPLEXES  ABNRM R PROG, CONSIDER ASMI OR LEAD PLACEMENT  No previous ECG available for comparison  Electronically Signed On 2020 16:46:00 PDT by MICHAELA TAYLOR MD         RADIOLOGY  DX-CHEST-PORTABLE (1 VIEW)   Final Result         1. No acute cardiopulmonary abnormalities are identified.        The radiologist's interpretation of all radiological studies  "have been reviewed by me.    COURSE & MEDICAL DECISION MAKING  Nursing notes, VS, PMSFHx reviewed in chart.    3:33 PM Patient seen and examined at bedside. Ordered for DX-Chest 1 view, CBC with differential, CMP, Troponin, EKG to evaluate. Discussed initial EKG findings with the patient which show a premature contraction, and at this time I am concerned his pain may be secondary to decreased blood flow to his heart. I will plan to check blood work and a chest X-Ray to further evaluate. Answered any questions or concerns the patient had. He understands and agrees.    EKG shows no evidence of acute dysrhythmia or ischemic changes but there are frequent PVCs    Chest x-ray shows no evidence of acute airspace disease, mass, cardiomegaly    Laboratory evaluation reveals leukocytosis at 11,200, electrolyte panel which is unremarkable, no evidence of acidosis, liver or renal dysfunction.  Coronavirus test is still pending.  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 Christopher, Hospitalist, who agrees to evaluate the patient for hospitalization.    5:14 PM - Updated patient on plan for hospitalization, he understands and agrees.     DISPOSITION:  Patient will be hospitalized by Dr. Christopher, Hospitalist in guarded condition.  He will need a stress test and understands his plan of care and agrees to hospitalization    FINAL IMPRESSION  1. Acute chest pain          Matthieu CABRERA (Scribe), am scribing for, and in the presence of, Augusto Zhang M.D..    Electronically signed by: Matthieu Contreras (Marie), 8/4/2020    Augusto CABRERA M.D. personally performed the services described in this documentation, as scribed by Matthieu Contreras in my presence, and it is both accurate and complete. C.    The note accurately reflects work and decisions made by me.  Augusto Zhang M.D.  8/4/2020  5:21 PM      "

## 2020-08-05 ENCOUNTER — APPOINTMENT (OUTPATIENT)
Dept: RADIOLOGY | Facility: MEDICAL CENTER | Age: 60
End: 2020-08-05
Attending: HOSPITALIST
Payer: MEDICAID

## 2020-08-05 ENCOUNTER — PATIENT OUTREACH (OUTPATIENT)
Dept: HEALTH INFORMATION MANAGEMENT | Facility: OTHER | Age: 60
End: 2020-08-05

## 2020-08-05 VITALS
SYSTOLIC BLOOD PRESSURE: 110 MMHG | BODY MASS INDEX: 43.55 KG/M2 | TEMPERATURE: 97.6 F | HEIGHT: 71 IN | HEART RATE: 74 BPM | WEIGHT: 311.07 LBS | DIASTOLIC BLOOD PRESSURE: 56 MMHG | RESPIRATION RATE: 18 BRPM | OXYGEN SATURATION: 94 %

## 2020-08-05 PROBLEM — R07.9 CHEST PAIN: Status: RESOLVED | Noted: 2020-08-04 | Resolved: 2020-08-05

## 2020-08-05 LAB
ALBUMIN SERPL BCP-MCNC: 3.9 G/DL (ref 3.2–4.9)
ALBUMIN/GLOB SERPL: 1.4 G/DL
ALP SERPL-CCNC: 36 U/L (ref 30–99)
ALT SERPL-CCNC: 19 U/L (ref 2–50)
ANION GAP SERPL CALC-SCNC: 16 MMOL/L (ref 7–16)
AST SERPL-CCNC: 15 U/L (ref 12–45)
BILIRUB SERPL-MCNC: 0.6 MG/DL (ref 0.1–1.5)
BUN SERPL-MCNC: 18 MG/DL (ref 8–22)
CALCIUM SERPL-MCNC: 8.6 MG/DL (ref 8.5–10.5)
CHLORIDE SERPL-SCNC: 100 MMOL/L (ref 96–112)
CO2 SERPL-SCNC: 18 MMOL/L (ref 20–33)
CREAT SERPL-MCNC: 0.71 MG/DL (ref 0.5–1.4)
ERYTHROCYTE [DISTWIDTH] IN BLOOD BY AUTOMATED COUNT: 45.3 FL (ref 35.9–50)
GLOBULIN SER CALC-MCNC: 2.7 G/DL (ref 1.9–3.5)
GLUCOSE SERPL-MCNC: 114 MG/DL (ref 65–99)
HCT VFR BLD AUTO: 39.7 % (ref 42–52)
HGB BLD-MCNC: 13.3 G/DL (ref 14–18)
LIPASE SERPL-CCNC: 239 U/L (ref 11–82)
MCH RBC QN AUTO: 30.7 PG (ref 27–33)
MCHC RBC AUTO-ENTMCNC: 33.5 G/DL (ref 33.7–35.3)
MCV RBC AUTO: 91.7 FL (ref 81.4–97.8)
PLATELET # BLD AUTO: 181 K/UL (ref 164–446)
PMV BLD AUTO: 10.6 FL (ref 9–12.9)
POTASSIUM SERPL-SCNC: 4.1 MMOL/L (ref 3.6–5.5)
PROT SERPL-MCNC: 6.6 G/DL (ref 6–8.2)
RBC # BLD AUTO: 4.33 M/UL (ref 4.7–6.1)
SODIUM SERPL-SCNC: 134 MMOL/L (ref 135–145)
TROPONIN T SERPL-MCNC: 11 NG/L (ref 6–19)
WBC # BLD AUTO: 9.2 K/UL (ref 4.8–10.8)

## 2020-08-05 PROCEDURE — 700102 HCHG RX REV CODE 250 W/ 637 OVERRIDE(OP): Performed by: NURSE PRACTITIONER

## 2020-08-05 PROCEDURE — G0378 HOSPITAL OBSERVATION PER HR: HCPCS

## 2020-08-05 PROCEDURE — A9270 NON-COVERED ITEM OR SERVICE: HCPCS | Performed by: NURSE PRACTITIONER

## 2020-08-05 PROCEDURE — 80053 COMPREHEN METABOLIC PANEL: CPT

## 2020-08-05 PROCEDURE — 83690 ASSAY OF LIPASE: CPT

## 2020-08-05 PROCEDURE — 71275 CT ANGIOGRAPHY CHEST: CPT

## 2020-08-05 PROCEDURE — 84484 ASSAY OF TROPONIN QUANT: CPT

## 2020-08-05 PROCEDURE — 99217 PR OBSERVATION CARE DISCHARGE: CPT | Performed by: INTERNAL MEDICINE

## 2020-08-05 PROCEDURE — A9270 NON-COVERED ITEM OR SERVICE: HCPCS | Performed by: HOSPITALIST

## 2020-08-05 PROCEDURE — 85027 COMPLETE CBC AUTOMATED: CPT

## 2020-08-05 PROCEDURE — 93018 CV STRESS TEST I&R ONLY: CPT | Performed by: INTERNAL MEDICINE

## 2020-08-05 PROCEDURE — 700102 HCHG RX REV CODE 250 W/ 637 OVERRIDE(OP): Performed by: HOSPITALIST

## 2020-08-05 PROCEDURE — 700117 HCHG RX CONTRAST REV CODE 255: Performed by: HOSPITALIST

## 2020-08-05 RX ORDER — OMEPRAZOLE 20 MG/1
20 CAPSULE, DELAYED RELEASE ORAL 2 TIMES DAILY
Qty: 30 CAP | Refills: 0 | Status: SHIPPED | OUTPATIENT
Start: 2020-08-05

## 2020-08-05 RX ORDER — ONDANSETRON 8 MG/1
8 TABLET, ORALLY DISINTEGRATING ORAL EVERY 8 HOURS PRN
Qty: 15 TAB | Refills: 0 | Status: SHIPPED | OUTPATIENT
Start: 2020-08-05

## 2020-08-05 RX ADMIN — VENLAFAXINE HYDROCHLORIDE 37.5 MG: 37.5 CAPSULE, EXTENDED RELEASE ORAL at 06:30

## 2020-08-05 RX ADMIN — TAMSULOSIN HYDROCHLORIDE 0.4 MG: 0.4 CAPSULE ORAL at 08:24

## 2020-08-05 RX ADMIN — FLUOXETINE 80 MG: 20 CAPSULE ORAL at 05:39

## 2020-08-05 RX ADMIN — IOHEXOL 60 ML: 350 INJECTION, SOLUTION INTRAVENOUS at 02:34

## 2020-08-05 RX ADMIN — NICOTINE 14 MG: 14 PATCH TRANSDERMAL at 05:38

## 2020-08-05 RX ADMIN — OMEPRAZOLE 20 MG: 20 CAPSULE, DELAYED RELEASE ORAL at 05:39

## 2020-08-05 RX ADMIN — FENOFIBRATE 134 MG: 134 CAPSULE ORAL at 06:30

## 2020-08-05 RX ADMIN — BUPROPION HYDROCHLORIDE 200 MG: 100 TABLET, EXTENDED RELEASE ORAL at 06:30

## 2020-08-05 NOTE — PROGRESS NOTES
Pt w/o c/o. To be discharged. Discussed pt's care with Hospitalist. Discharge instructions given to patient at bedside, verbalizes understanding and states plans for follow-up. Smoking cessation education given. Telemetry monitor/IV cathlon removed. All belongings accounted for, all questions answered at this time. Pt given bus pass per his request. Denies further needs. Walked out by CNA. Denies need for WC.

## 2020-08-05 NOTE — PROGRESS NOTES
A/o,respirations are even and unlabored on room air,assessment completed, vital signs stable, SR with PVCs on the monitor, IV fluids running per orders,  updated communication board,  poc discussed and understood, verbalized understanding, box meal given, pt aware NPO after mid for stress test, all questions answered at this time , fall precautions in place, call button within reach, will continue to monitor

## 2020-08-05 NOTE — PROGRESS NOTES
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  Admit to telemetry to r/o MI, CoVID test PENDING, however given high risk, will go ahead admit to obs to CoVID unit.  Dr. Tom to complete admission.

## 2020-08-05 NOTE — DISCHARGE INSTRUCTIONS
Discharge Instructions    Discharged to home by car with friend. Discharged via walking, hospital escort: declines  Special equipment needed: Not Applicable    Be sure to schedule a follow-up appointment with your primary care doctor or any specialists as instructed.     Discharge Plan:   Diet Plan: Discussed  Activity Level: Discussed - activity as tolerated  Smoking Cessation Offered: Patient educated to quit smoking  Confirmed Follow up Appointment: Patient to Call and Schedule Appointment - follow up with a primary care physician as soon as possible.   Confirmed Symptoms Management: Discussed  Medication Reconciliation Updated: Yes    I understand that a diet low in cholesterol, fat, and sodium is recommended for good health. Unless I have been given specific instructions below for another diet, I accept this instruction as my diet prescription.           · Is patient discharged on Warfarin / Coumadin?   No     Depression / Suicide Risk    As you are discharged from this [facility] facility, it is important to learn how to keep safe from harming yourself.    Recognize the warning signs:  · Abrupt changes in personality, positive or negative- including increase in energy   · Giving away possessions  · Change in eating patterns- significant weight changes-  positive or negative  · Change in sleeping patterns- unable to sleep or sleeping all the time   · Unwillingness or inability to communicate  · Depression  · Unusual sadness, discouragement and loneliness  · Talk of wanting to die  · Neglect of personal appearance   · Rebelliousness- reckless behavior  · Withdrawal from people/activities they love  · Confusion- inability to concentrate     If you or a loved one observes any of these behaviors or has concerns about self-harm, here's what you can do:  · Talk about it- your feelings and reasons for harming yourself  · Remove any means that you might use to hurt yourself (examples: pills, rope, extension cords,  firearm)  · Get professional help from the community (Mental Health, Substance Abuse, psychological counseling)  · Do not be alone:Call your Safe Contact- someone whom you trust who will be there for you.  · Call your local CRISIS HOTLINE 503-6957 or 273-551-2786  · Call your local Children's Mobile Crisis Response Team [place] (129) 208-8706 or www.brettapproved  · Call the toll free National Suicide Prevention Hotlines   · National Suicide Prevention Lifeline 208-702-IUYH (0806)  · National Hope Line Network 800-SUICIDE (991-2344)

## 2020-08-05 NOTE — ASSESSMENT & PLAN NOTE
Troponin negative, EKG did not show acute ischemic changes, d-dimer is elevated, will get CTA with contrast to rule out PE, if this is negative will proceed with stress test in a.m.  Start PPI

## 2020-08-05 NOTE — H&P
Hospital Medicine History & Physical Note    Date of Service  8/4/2020    Primary Care Physician  No primary care provider on file.    Code Status  Full Code    Chief Complaint  Chief Complaint   Patient presents with   • Chest Pain     substernal chest pain,        History of Presenting Illness  59 y.o. male who presented 8/4/2020 with past medical history of depression, BPH, is coming today complaining of chest pain, started last night, also woke up this morning having chest pain, is in the left side of his chest, not radiated, increased with deep inspiration, no alleviating factors, denies history of trauma, denies any palpitations, no dizziness lightheadedness, no nausea no vomiting, no focal weakness, patient rates the pain 5 out of 10, patient is stated that he has a positive COVID-19 contact at home, his SARS-CoV-2 is negative, chest x-ray did not show any infiltrates, patient complaining of mild shortness of breath but no cough no phlegm no fevers, denies lower extremity edema, no PND no orthopnea, patient initial work-up in emergency room showed mild leukocytosis 11.2, mild hyponatremia 133, troponins negative x2, magnesium 2.0, d-dimer more than 20, chest x-ray no infiltrates, EKG sinus rhythm no ischemic changes.  Patient is going to be admitted, plan of care has been discussed with patient, all questions have been answered.    Review of Systems  Review of Systems   Constitutional: Negative for chills and fever.   HENT: Negative for congestion, nosebleeds and sinus pain.    Eyes: Negative for blurred vision and double vision.   Respiratory: Positive for shortness of breath. Negative for cough, hemoptysis, sputum production and wheezing.    Cardiovascular: Positive for chest pain. Negative for palpitations, claudication, leg swelling and PND.   Gastrointestinal: Negative for abdominal pain, heartburn, nausea and vomiting.   Genitourinary: Negative for frequency, hematuria and urgency.   Musculoskeletal:  Negative for back pain and myalgias.   Skin: Negative for rash.   Neurological: Negative for dizziness and headaches.   Endo/Heme/Allergies: Does not bruise/bleed easily.   Psychiatric/Behavioral: Negative for depression, hallucinations, substance abuse and suicidal ideas.       Past Medical History   has a past medical history of BPH (benign prostatic hyperplasia), GERD (gastroesophageal reflux disease), and Hypertension.    Surgical History   has a past surgical history that includes cholecystectomy.     Family History  Positive for diabetes    Social History   reports that he has been smoking cigarettes. He has been smoking about 0.50 packs per day. He has never used smokeless tobacco. He reports that he does not drink alcohol or use drugs.    Allergies  Allergies   Allergen Reactions   • Penicillins        Medications  Prior to Admission Medications   Prescriptions Last Dose Informant Patient Reported? Taking?   buPROPion (WELLBUTRIN XL) 300 MG XL tablet 8/4/2020 at AM Caregiver Yes Yes   Sig: Take 300 mg by mouth every morning. Pt takes 300 mg and 150 mg for a total dose of 450 mg   buPROPion SR (WELLBUTRIN-SR) 150 MG TABLET SR 12 HR sustained-release tablet 8/4/2020 at AM Caregiver Yes Yes   Sig: Take 150 mg by mouth every day. Pt takes 300 mg and 150 mg for a total dose of 450 mg   fenofibrate (TRIGLIDE) 160 MG tablet 8/4/2020 at AM Caregiver Yes Yes   Sig: Take 160 mg by mouth every morning.   fluoxetine (PROZAC) 40 MG capsule 8/4/2020 at AM Caregiver Yes Yes   Sig: Take 80 mg by mouth every morning. 2 capsules = 80 mg   ibuprofen (MOTRIN) 800 MG Tab 8/3/2020 at PM Caregiver Yes Yes   Sig: Take 800 mg by mouth every 6 hours as needed for Mild Pain.   omeprazole (PRILOSEC) 20 MG delayed-release capsule 8/4/2020 at AM Caregiver Yes Yes   Sig: Take 20 mg by mouth every morning.   tamsulosin (FLOMAX) 0.4 MG capsule 8/4/2020 at AM Caregiver Yes Yes   Sig: Take 0.4 mg by mouth ONE-HALF HOUR AFTER BREAKFAST.    venlafaxine XR (EFFEXOR XR) 37.5 MG CAPSULE SR 24 HR 8/4/2020 at AM Caregiver Yes Yes   Sig: Take 37.5 mg by mouth every morning.      Facility-Administered Medications: None       Physical Exam  Temp:  [36.6 °C (97.8 °F)] 36.6 °C (97.8 °F)  Pulse:  [79-81] 79  Resp:  [16-21] 21  BP: (119-137)/(68-75) 137/71  SpO2:  [93 %-98 %] 93 %    Physical Exam  Vitals signs and nursing note reviewed.   Constitutional:       General: He is not in acute distress.     Appearance: Normal appearance. He is obese.   HENT:      Head: Normocephalic and atraumatic.      Nose: Nose normal.      Mouth/Throat:      Mouth: Mucous membranes are moist.      Pharynx: Oropharynx is clear.   Eyes:      General:         Right eye: No discharge.         Left eye: No discharge.      Conjunctiva/sclera: Conjunctivae normal.      Pupils: Pupils are equal, round, and reactive to light.   Neck:      Musculoskeletal: Normal range of motion and neck supple. No neck rigidity or muscular tenderness.   Cardiovascular:      Rate and Rhythm: Normal rate and regular rhythm.      Pulses: Normal pulses.      Heart sounds: Normal heart sounds.   Pulmonary:      Effort: Pulmonary effort is normal. No respiratory distress.      Breath sounds: Normal breath sounds. No wheezing.   Abdominal:      General: Bowel sounds are normal. There is no distension.      Palpations: Abdomen is soft.      Tenderness: There is no abdominal tenderness. There is no guarding or rebound.   Musculoskeletal: Normal range of motion.   Skin:     General: Skin is warm and dry.      Capillary Refill: Capillary refill takes less than 2 seconds.      Coloration: Skin is not jaundiced.   Neurological:      General: No focal deficit present.      Mental Status: He is alert and oriented to person, place, and time.      Cranial Nerves: No cranial nerve deficit.   Psychiatric:         Mood and Affect: Mood normal.         Behavior: Behavior normal.         Laboratory:  Recent Labs      08/04/20  1530   WBC 11.2*   RBC 4.85   HEMOGLOBIN 14.9   HEMATOCRIT 43.7   MCV 90.1   MCH 30.7   MCHC 34.1   RDW 43.6   PLATELETCT 200   MPV 11.0     Recent Labs     08/04/20  1530   SODIUM 133*   POTASSIUM 4.0   CHLORIDE 99   CO2 18*   GLUCOSE 99   BUN 14   CREATININE 0.63   CALCIUM 9.1     Recent Labs     08/04/20  1530   ALTSGPT 28   ASTSGOT 25   ALKPHOSPHAT 41   TBILIRUBIN 0.9   GLUCOSE 99         No results for input(s): NTPROBNP in the last 72 hours.      Recent Labs     08/04/20  1530   TROPONINT 8       Imaging:  DX-CHEST-PORTABLE (1 VIEW)   Final Result         1. No acute cardiopulmonary abnormalities are identified.      Cardiac Stress Test Treadmill without Images    (Results Pending)         Assessment/Plan:  I anticipate this patient is appropriate for observation status at this time.    BPH (benign prostatic hyperplasia)- (present on admission)  Assessment & Plan  Continue Flomax    Depression- (present on admission)  Assessment & Plan  Stable, continue home medications    Chest pain- (present on admission)  Assessment & Plan  Troponin negative, EKG did not show acute ischemic changes, d-dimer is elevated, will get CTA with contrast to rule out PE, if this is negative will proceed with stress test in a.m.  Start PPI      DVT prophylaxis: SCDs

## 2020-08-05 NOTE — DISCHARGE SUMMARY
Discharge Summary    CHIEF COMPLAINT ON ADMISSION  Chief Complaint   Patient presents with   • Chest Pain     substernal chest pain,        Reason for Admission  EMS     Admission Date  8/4/2020    CODE STATUS  Full Code    HPI & HOSPITAL COURSE  This is a 59 y.o. male here with substernal and epigastric pain.  Initial EKG was negative for ischemic process. He underwent a stress test that was negative for ischemia. D dimer was elevated > 20. CTA chest with IV contrast was negative for ischemia but revealed evidence of pancreatitis. His lipase was elevated at 239. He reports a history of cholecystectomy and does not drink alcohol.  Pt was treated with IVF and his symptoms improved. He is able to tolerate a diet and reports improvement in his pain. Patient was instructed to return to the ER if his symptoms worsened.        Therefore, he is discharged in good and stable condition to home with close outpatient follow-up.        Discharge Date  8/5/2020    FOLLOW UP ITEMS POST DISCHARGE  Follow up with PCP    DISCHARGE DIAGNOSES  Active Problems:    Depression POA: Yes    BPH (benign prostatic hyperplasia) POA: Yes  Resolved Problems:    Chest pain POA: Yes      FOLLOW UP  No future appointments.  No follow-up provider specified.    MEDICATIONS ON DISCHARGE     Medication List      START taking these medications      Instructions   ondansetron 8 MG Tbdp  Commonly known as: Zofran ODT   Take 1 Tab by mouth every 8 hours as needed for Nausea.  Dose: 8 mg        CHANGE how you take these medications      Instructions   omeprazole 20 MG delayed-release capsule  What changed: when to take this  Commonly known as: PRILOSEC   Take 1 Cap by mouth 2 times a day.  Dose: 20 mg        CONTINUE taking these medications      Instructions   * buPROPion 300 MG XL tablet  Commonly known as: WELLBUTRIN XL   Take 300 mg by mouth every morning. Pt takes 300 mg and 150 mg for a total dose of 450 mg  Dose: 300 mg     * buPROPion  MG  Tb12 sustained-release tablet  Commonly known as: WELLBUTRIN-SR   Take 150 mg by mouth every day. Pt takes 300 mg and 150 mg for a total dose of 450 mg  Dose: 150 mg     fenofibrate 160 MG tablet  Commonly known as: TRIGLIDE   Take 160 mg by mouth every morning.  Dose: 160 mg     fluoxetine 40 MG capsule  Commonly known as: PROZAC   Take 80 mg by mouth every morning. 2 capsules = 80 mg  Dose: 80 mg     tamsulosin 0.4 MG capsule  Commonly known as: FLOMAX   Take 0.4 mg by mouth ONE-HALF HOUR AFTER BREAKFAST.  Dose: 0.4 mg     venlafaxine XR 37.5 MG Cp24  Commonly known as: EFFEXOR XR   Take 37.5 mg by mouth every morning.  Dose: 37.5 mg         * This list has 2 medication(s) that are the same as other medications prescribed for you. Read the directions carefully, and ask your doctor or other care provider to review them with you.            STOP taking these medications    ibuprofen 800 MG Tabs  Commonly known as: MOTRIN            Allergies  Allergies   Allergen Reactions   • Penicillins        DIET  Orders Placed This Encounter   Procedures   • Diet Order Cardiac     Standing Status:   Standing     Number of Occurrences:   1     Order Specific Question:   Diet:     Answer:   Cardiac [6]       ACTIVITY  As tolerated.  Weight bearing as tolerated    CONSULTATIONS  None    PROCEDURES  None    LABORATORY  Lab Results   Component Value Date    SODIUM 134 (L) 08/05/2020    POTASSIUM 4.1 08/05/2020    CHLORIDE 100 08/05/2020    CO2 18 (L) 08/05/2020    GLUCOSE 114 (H) 08/05/2020    BUN 18 08/05/2020    CREATININE 0.71 08/05/2020        Lab Results   Component Value Date    WBC 9.2 08/05/2020    HEMOGLOBIN 13.3 (L) 08/05/2020    HEMATOCRIT 39.7 (L) 08/05/2020    PLATELETCT 181 08/05/2020        Total time of the discharge process exceeds 32 minutes.

## 2020-08-05 NOTE — CARE PLAN
Problem: Pain Management  Goal: Pain level will decrease to patient's comfort goal  Outcome: PROGRESSING AS EXPECTED    Denies need for pain medication. Assessing every four hrs for pain per protocol; see doc flowsheets        Problem: Knowledge Deficit  Goal: Knowledge of disease process/condition, treatment plan, diagnostic tests, and medications will improve  Outcome: PROGRESSING AS EXPECTED    Discussed plan of care for today w/ pt this am, he is A+O, he verbalizes understanding of his plan of care, no questions. Emotional support given. Reinforcing education as necessary. Stress test today. Discussed pt's care with Hospitalist in rounds.

## 2020-08-05 NOTE — PROGRESS NOTES
Received care of pt from NOC RN this am. Pt is A+O x 4, denies need for pain medication. Leaving floor via WC with transport staff at this time to go to stress test.

## 2020-08-05 NOTE — ED NOTES
Med Rec complete per phone interview with pt's caregiver @ [facility]   Allergies Reviewed  No ABX in the last 14 days

## 2020-08-05 NOTE — ED NOTES
Report successfully called to CDU RN. CDU requesting assistance in transporting pt to unit. Charge RN made aware, await available ACLS staff for transport.

## 2020-08-05 NOTE — PROGRESS NOTES
Patient educated re: treadmill stress test. Nursing goals identified: knowledge deficit, potential for anxiety r/t stress test, potential for compromised cardiac output. Care plan includes educating patient, reassurance and access to ACLS cart/team. Pt denies CP, denies valve disease, denies taking Beta blocker, confirmed on MAR. Patient prepped for treadmill stress test. Pt achieved 90% of MPHR, MAX HR = 140 METS = 7.0, total exercise time 6:17. Procedure ended due to fatigue and target HR achieved. Patient c/o fatigue, tight calves.  No CP. See documentation. Returned to SDU via w/c with Pepper.

## 2020-08-18 ENCOUNTER — PATIENT OUTREACH (OUTPATIENT)
Dept: HEALTH INFORMATION MANAGEMENT | Facility: OTHER | Age: 60
End: 2020-08-18

## 2020-08-20 NOTE — PROGRESS NOTES
Community Health Worker Intake  • Social determinates of health intake complete.   • Identified barriers to establishing with a PCP.  • Contact information provided to Edil Rosen.  • Has PCP appointment scheduled for Tues, Sept 1 w/ Anita Salas at 12pm at [facility].  • Outpatient assessment completed.  • Did the patient receive medications post discharge: Yes    Plan:  8/20 CHW Debbie spoke to pt via telephone. Pt expressed he is doing a lot better. Pt needs help with establishing with a PCP. CHW made appt for pt at [facility] with Anita Salas. Pt does not need any resources at the moment, but CHW informed pt CCM is here to support him. CHW encouraged pt to call CCM if he needs further assistance.     Plan: CHW will call pt to inform him about his PCP appt.     Outcome: CHW informed pt re: PCP appt. Pt met all goals 8/21 and will be d/c from CCM services.

## 2020-08-21 SDOH — ECONOMIC STABILITY: FOOD INSECURITY: WITHIN THE PAST 12 MONTHS, THE FOOD YOU BOUGHT JUST DIDN'T LAST AND YOU DIDN'T HAVE MONEY TO GET MORE.: NEVER TRUE

## 2020-08-21 SDOH — ECONOMIC STABILITY: INCOME INSECURITY: HOW HARD IS IT FOR YOU TO PAY FOR THE VERY BASICS LIKE FOOD, HOUSING, MEDICAL CARE, AND HEATING?: NOT VERY HARD

## 2020-08-21 SDOH — ECONOMIC STABILITY: FOOD INSECURITY: WITHIN THE PAST 12 MONTHS, YOU WORRIED THAT YOUR FOOD WOULD RUN OUT BEFORE YOU GOT MONEY TO BUY MORE.: NEVER TRUE

## 2020-08-21 SDOH — ECONOMIC STABILITY: TRANSPORTATION INSECURITY
IN THE PAST 12 MONTHS, HAS THE LACK OF TRANSPORTATION KEPT YOU FROM MEDICAL APPOINTMENTS OR FROM GETTING MEDICATIONS?: NO

## 2020-08-21 SDOH — ECONOMIC STABILITY: TRANSPORTATION INSECURITY
IN THE PAST 12 MONTHS, HAS LACK OF TRANSPORTATION KEPT YOU FROM MEETINGS, WORK, OR FROM GETTING THINGS NEEDED FOR DAILY LIVING?: NO

## 2023-03-21 ENCOUNTER — OFFICE VISIT (OUTPATIENT)
Dept: URGENT CARE | Facility: CLINIC | Age: 63
End: 2023-03-21
Payer: MEDICAID

## 2023-03-21 VITALS
TEMPERATURE: 97.9 F | HEART RATE: 90 BPM | HEIGHT: 71 IN | WEIGHT: 250 LBS | BODY MASS INDEX: 35 KG/M2 | RESPIRATION RATE: 14 BRPM | OXYGEN SATURATION: 99 % | SYSTOLIC BLOOD PRESSURE: 126 MMHG | DIASTOLIC BLOOD PRESSURE: 80 MMHG

## 2023-03-21 DIAGNOSIS — R11.0 NAUSEA: ICD-10-CM

## 2023-03-21 DIAGNOSIS — R14.0 ABDOMINAL BLOATING: ICD-10-CM

## 2023-03-21 DIAGNOSIS — R19.7 DIARRHEA, UNSPECIFIED TYPE: ICD-10-CM

## 2023-03-21 PROCEDURE — 99203 OFFICE O/P NEW LOW 30 MIN: CPT | Performed by: PHYSICIAN ASSISTANT

## 2023-03-21 RX ORDER — ONDANSETRON 4 MG/1
4 TABLET, ORALLY DISINTEGRATING ORAL EVERY 6 HOURS PRN
Qty: 15 TABLET | Refills: 0 | Status: SHIPPED | OUTPATIENT
Start: 2023-03-21

## 2023-03-21 NOTE — PROGRESS NOTES
"Subjective:   Edil Rosen is a 62 y.o. male who presents for Abdominal Pain (Cramps in legs 2 days ago, abd bloating, diarrhea)      HPI  The patient presents to the Urgent Care with complaints of abdominal bloating, nausea, and diarrhea onset 3 days ago.  Symptoms started with leg cramping.  He thought he was dehydrated so he increase his fluid intake.  Feels bloated in his stomach and intermittent upper abdominal discomfort described as cramping.  This abdominal cramping relieved with a BM.  Watery stools without bloody or black stools about 2-3 episodes per day.  Associated nausea.  No vomiting. Denies any fever, cough, chest pain, SOB, vomiting, urinary symptoms. Decreased appetite. Tolerating fluids well.  Does not drink alcohol.  History of cholecystectomy.  Smokes cigarettes every day.  History of GERD.  No recent traveling.    Medications:    buPROPion  buPROPion SR Tb12  fenofibrate  fluoxetine  omeprazole  ondansetron Tbdp  oxyCODONE-acetaminophen Tabs  tamsulosin  venlafaxine XR Cp24    Allergies: Pcn [penicillins] and Penicillins    Problem List: Edil Rosen does not have any pertinent problems on file.    Surgical History:  Past Surgical History:   Procedure Laterality Date    CHOLECYSTECTOMY         Past Social Hx: Edil Rosen  reports that he has been smoking cigarettes. He started smoking about 43 years ago. He has been smoking an average of .5 packs per day. He has never used smokeless tobacco. He reports that he does not drink alcohol and does not use drugs.     Past Family Hx:  Edil Rosen family history includes Cancer in his mother.     Problem list, medications, and allergies reviewed by myself today in Epic.     Objective:     /80   Pulse 90   Temp 36.6 °C (97.9 °F) (Temporal)   Resp 14   Ht 1.803 m (5' 11\")   Wt 113 kg (250 lb)   SpO2 99%   BMI 34.87 kg/m²     Physical Exam  Vitals reviewed.   Constitutional:       General: He is not in acute distress.     Appearance: Normal " appearance. He is not ill-appearing or toxic-appearing.   HENT:      Mouth/Throat:      Mouth: Mucous membranes are moist.      Pharynx: Oropharynx is clear.   Eyes:      Conjunctiva/sclera: Conjunctivae normal.      Pupils: Pupils are equal, round, and reactive to light.   Cardiovascular:      Rate and Rhythm: Normal rate and regular rhythm.      Heart sounds: Normal heart sounds.   Pulmonary:      Effort: Pulmonary effort is normal.      Breath sounds: Normal breath sounds.   Abdominal:      General: Bowel sounds are increased. There is distension (mild).      Palpations: There is no mass.      Tenderness: There is generalized abdominal tenderness (mild). Negative signs include McBurney's sign.   Musculoskeletal:      Cervical back: Neck supple.   Skin:     General: Skin is warm and dry.   Neurological:      General: No focal deficit present.      Mental Status: He is alert and oriented to person, place, and time.   Psychiatric:         Mood and Affect: Mood normal.         Behavior: Behavior normal.       Diagnosis and associated orders:     1. Abdominal bloating    2. Nausea  - ondansetron (ZOFRAN ODT) 4 MG TABLET DISPERSIBLE; Take 1 Tablet by mouth every 6 hours as needed for Nausea/Vomiting.  Dispense: 15 Tablet; Refill: 0    3. Diarrhea, unspecified type       Comments/MDM:     The patient's presenting symptoms and exam findings are consistent with a viral gastroenteritis.  Patient overall is well-appearing in no acute distress.  Normal vital signs.  Tolerating fluids well. Mild generalized abdominal tenderness without peritoneal signs. Discussed differentials. Recommend increase fluid intake such as sips of fluids and Pedialyte, bland or BRAT diet and increase as tolerated. Avoid dairy of fatty foods for now. If no improvement in 5 to 7 days or any worsening, recommend returning to the urgent care or following up with PCP for re-evaluation.  Patient understands to present immediately to the ER if any severe  abdominal pain, unable to tolerate fluids, or any other concerns     I personally reviewed prior external notes and test results pertinent to today's visit. Pathogenesis of diagnosis discussed including typical length and natural progression. Supportive care, natural history, differential diagnoses, and indications for immediate follow-up discussed. Patient expresses understanding and agrees to plan. Patient denies any other questions or concerns.     Follow-up with the primary care physician for recheck, reevaluation, and consideration of further management.    Please note that this dictation was created using voice recognition software. I have made a reasonable attempt to correct obvious errors, but I expect that there are errors of grammar and possibly content that I did not discover before finalizing the note.    This note was electronically signed by Chris Watts PA-C